# Patient Record
(demographics unavailable — no encounter records)

---

## 2025-07-30 NOTE — END OF VISIT
[] : Fellow [FreeTextEntry3] : Incidentally 1.1cm periph himanshu nodule on June scan. Nonsmoker, low risk.  Repeat CT chest ordered. Pt going to [facility] later this week for a month, will schedule for return. Pt accompanied by daughter and granddaughter

## 2025-07-30 NOTE — PHYSICAL EXAM
[No Acute Distress] : no acute distress [Normal Oropharynx] : normal oropharynx [Normal Appearance] : normal appearance [No Neck Mass] : no neck mass [Normal Rate/Rhythm] : normal rate/rhythm [Normal S1, S2] : normal s1, s2 [No Murmurs] : no murmurs [No Resp Distress] : no resp distress [Clear to Auscultation Bilaterally] : clear to auscultation bilaterally [No Abnormalities] : no abnormalities [No Clubbing] : no clubbing [No Edema] : no edema [Normal Color/ Pigmentation] : normal color/ pigmentation [Oriented x3] : oriented x3 [Normal Affect] : normal affect

## 2025-07-30 NOTE — HISTORY OF PRESENT ILLNESS
[Never] : never [TextBox_4] : 74F w/ PMH gastritis, HTN, kidney stones presented to hospital for palpitations and chest pain; had ACS work-up, CTA to r/o PE, which incidentally found MELLY 1.1cm nodule. No prior imaging available.  No pulm symptoms - no cough, dyspnea, hemoptysis. Endorses phlegm production with frequent throat clearing. No constitutional symptoms.  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 Never smoker. No other occupational exposures she knows of.

## 2025-07-30 NOTE — DISCUSSION/SUMMARY
[FreeTextEntry1] : 74F w/ PMH gastritis, HTN, kidney stones presented to hospital for palpitations and chest pain; had ACS work-up, CTA to r/o PE, which incidentally found MELLY 1.1cm nodule. No prior imaging available.   Nodule is solid, some areas of irregular border.  Not associated with any pulmonary, constitutional symptoms.   Plan:  - repeat CT chest early Sept (approx 3 months and patient is traveling) - if nodule is same size or large will pursue PET  - will call with results of CT scan and advise on next steps   D/w Dr Lisker

## 2025-07-30 NOTE — REVIEW OF SYSTEMS
[Sputum] : sputum [Negative] : Musculoskeletal [Cough] : no cough [Hemoptysis] : no hemoptysis [Chest Tightness] : no chest tightness [Frequent URIs] : no frequent URIs [Dyspnea] : no dyspnea [Pleuritic Pain] : no pleuritic pain [Wheezing] : no wheezing [SOB on Exertion] : no sob on exertion [Chest Discomfort] : no chest discomfort [Orthopnea] : no orthopnea [Watery Eyes] : no watery eyes [Nasal Discharge] : no nasal discharge

## 2025-07-30 NOTE — HISTORY OF PRESENT ILLNESS
[Never] : never [TextBox_4] : 74F w/ PMH gastritis, HTN, kidney stones presented to hospital for palpitations and chest pain; had ACS work-up, CTA to r/o PE, which incidentally found MELLY 1.1cm nodule. No prior imaging available.  No pulm symptoms - no cough, dyspnea, hemoptysis. Endorses phlegm production with frequent throat clearing. No constitutional symptoms.  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 Never smoker. No other occupational exposures she knows of.

## 2025-07-30 NOTE — END OF VISIT
[] : Fellow [FreeTextEntry3] : Incidentally 1.1cm periph himanshu nodule on June scan. Nonsmoker, low risk.  Repeat CT chest ordered. Pt going to [facility] later this week for a month, will schedule for return. Pt accompanied by daughter and granddaughter